# Patient Record
(demographics unavailable — no encounter records)

---

## 2025-03-05 NOTE — HEALTH RISK ASSESSMENT
[Very Good] : ~his/her~  mood as very good [Yes] : Yes [Monthly or less (1 pt)] : Monthly or less (1 point) [1 or 2 (0 pts)] : 1 or 2 (0 points) [Never (0 pts)] : Never (0 points) [No] : In the past 12 months have you used drugs other than those required for medical reasons? No [No falls in past year] : Patient reported no falls in the past year [0] : 2) Feeling down, depressed, or hopeless: Not at all (0) [PHQ-2 Negative - No further assessment needed] : PHQ-2 Negative - No further assessment needed [Never] : Never [NO] : No [HIV Test offered] : HIV Test offered [Hepatitis C test offered] : Hepatitis C test offered [None] : None [With Family] : lives with family [Employed] : employed [] :  [# Of Children ___] : has [unfilled] children [Fully functional (bathing, dressing, toileting, transferring, walking, feeding)] : Fully functional (bathing, dressing, toileting, transferring, walking, feeding) [Fully functional (using the telephone, shopping, preparing meals, housekeeping, doing laundry, using] : Fully functional and needs no help or supervision to perform IADLs (using the telephone, shopping, preparing meals, housekeeping, doing laundry, using transportation, managing medications and managing finances) [Reports normal functional visual acuity (ie: able to read med bottle)] : Reports normal functional visual acuity [Smoke Detector] : smoke detector [Safety elements used in home] : safety elements used in home [Seat Belt] :  uses seat belt [Audit-CScore] : 1 [de-identified] : as above [de-identified] : as above [[id number]] : 0 [Change in mental status noted] : No change in mental status noted [Language] : denies difficulty with language [Handling Complex Tasks] : denies difficulty handling complex tasks [Reports changes in hearing] : Reports no changes in hearing [Reports changes in vision] : Reports no changes in vision [Reports changes in dental health] : Reports no changes in dental health [TB Exposure] : is not being exposed to tuberculosis [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

## 2025-03-05 NOTE — HISTORY OF PRESENT ILLNESS
[FreeTextEntry1] : new patient exam, noticed skin changes on lower legs [de-identified] : NIMISHA UQEEN is a 40 year old male who presents for new patient comprehensive exam. Last CPE was in 2022 with former PCP. Review of chart note: Mild Hyperlipidemia (), Vitamin D insufficiency, Allergic Rhinitis, Asthma (controlled/off med) Some symptoms of reflux--epigastric burning. Intermittent, generally thinks could be related to certain foods/late night eating.  Lower legs has noticed some discolored "spots" over the year. No associated itching/burning or pain. Admits he has a lot of dry skin, generally does not moisturize much.   Has gained ~10-15 lbs over the past 2 yrs. Diet: +fruits/veggies, protein; tends to "overeat" Exercise: admits exercise has fallen a bit since having his kids, thinks he can get back on track. Works as a retail pharmacist.

## 2025-03-05 NOTE — PHYSICAL EXAM
[Normal] : no joint swelling and grossly normal strength and tone [de-identified] : Right knee: ~1.5 cm scab (non-fully healing per patient, since December)--no crusting or oozing. Lower legs bilaterally note xerotic skin and fine hyperpigmented flat spots (freckles)--none with apparent concerning features. Scattered freckles on face. Small circular area of hair loss on right scalp, left/right beardline

## 2025-03-05 NOTE — PLAN
[FreeTextEntry1] : Labs drawn in office  #GERD: discussed anti-reflux diet, reduce caffeine and late-night eating/snacking; consider PPI if symptoms persists despite above #Freckles, ?alopecia: suggested dermatology evaluation; stressed importance of sunscreen #Right knee scab: not fully healing x 2.5 months; ?MRSA--Rx Mupirocin 2+ ointment, mix with OTC Hydrocortisone 1% and apply daily for 2 weeks. #Xerotic skin: advised on moisturizing with OTC Aquaphor thin film to extremities at bedtime. #Elevated blood pressure: discussed lowering salt, caffeine and increasing CV exercise, f/u in 3 months.  Flu/COVID19 booster TdaP vaccines: all UTD per patient.

## 2025-06-25 NOTE — PHYSICAL EXAM
[Normal] : normal rate, regular rhythm, normal S1 and S2 and no murmur heard [Normal Gait] : normal gait [Normal Affect] : the affect was normal [Normal Insight/Judgement] : insight and judgment were intact [de-identified] : Right knee no skin breakdown, hyperpigmented skin in area of prior nonhealing scab

## 2025-06-25 NOTE — HISTORY OF PRESENT ILLNESS
[FreeTextEntry1] : 3 month follow-up [de-identified] : NIMISHA QUEEN is a 40 year old male who presents for medical follow-up to recheck blood pressure and re-evaluate GERD, Hyperlipidemia, Right knee scab. His BP was mildly high last visit--to recheck today. He has been checking at home, readings have been consistently in 140-150/90. Maintains generally bland/low salt diet.  Reflux symptoms much better on anti-reflux diet. He was advised on his last visit on management of his knee scab, has generally resolved.  Fasting for labwork.

## 2025-06-25 NOTE — PLAN
[FreeTextEntry1] : Labs drawn in office today #Hypertension: RAB of meds discussed, will Rx HCTZ 12.5 mg daily, f/u 3m, check CMP #Hyperlipidemia: on diet, exercise, f/u lipid profile #GERD: improved, on anti-reflux diet #Right knee scab: no further skin breakdown, encouraged to avoid kneeling--when needs to try to wear bandaid or use cushion   F/u 3m BP check, repeat labs CMP, Lipids